# Patient Record
(demographics unavailable — no encounter records)

---

## 2024-10-10 NOTE — ASSESSMENT
[FreeTextEntry1] : #bleeding papule of skin, L lateral great toe ddx PG vs poroma vs VV vs MM vs NMSC vs other Therapeutic options and their risks and benefits; along with multiple diagnostic possibilities were discussed at length; risks and benefits of further study were discussed pt amenable to bx today for diagnostic and therapeutic purposes, discussed risk of slow healing in this site  NUBS,  L lateral great toe Biopsy by Shave The risks/benefits/alternatives of skin biopsy were explained to the patient, which include and are not limited to bleeding, infection, scarring or discoloration of skin, and recurrence of lesion. Patient expressed understanding of these risks and provided consent to the procedure. Time out with verification of patient and lesion site was performed. Site was prepped with rubbing alcohol, lidocaine with epinephrine was injected for anesthesia, and biopsy was performed. Hemostasis with electrocautery. Specimen sent to path. Procedure was without complication and well tolerated. Wound care was discussed.  #chronic L lower leg ulcers in setting of likely chronic venous stasis and lymphedema favor venous ulcers slowly improving unlikely infectious vs inflammatory vs hypertensive vs neoplastic referral to Dr. Martines , [facility] sx for evaluation and management of venous stasis/lymphedema/wound care  #elephantiasis nostras verrucosa discussed challenges in treating must address potential underlying chronic venous stasis and lymphedema start urea 40% or eucerin roughness relief cream BID to AA, not onto ulcers

## 2024-10-10 NOTE — CONSULT LETTER
[Dear  ___] : Dear  [unfilled], [Consult Letter:] : I had the pleasure of evaluating your patient, [unfilled]. [Please see my note below.] : Please see my note below. [Consult Closing:] : Thank you very much for allowing me to participate in the care of this patient.  If you have any questions, please do not hesitate to contact me. [Sincerely,] : Sincerely, [FreeTextEntry3] : Olivia Lopez MD Department of Dermatology [facility]

## 2024-10-10 NOTE — CONSULT LETTER
[Dear  ___] : Dear  [unfilled], [Consult Letter:] : I had the pleasure of evaluating your patient, [unfilled]. [Please see my note below.] : Please see my note below. [Consult Closing:] : Thank you very much for allowing me to participate in the care of this patient.  If you have any questions, please do not hesitate to contact me. [Sincerely,] : Sincerely, [FreeTextEntry3] : Olivia Lopez MD Department of Dermatology [facility]

## 2024-10-10 NOTE — HISTORY OF PRESENT ILLNESS
[FreeTextEntry1] : np growth [de-identified] : Referred by: Dr. Perez   Ms. OSMIN SANTIAGO  is a 68 year old F w/ RA on xeljanz here for evaluation of below  #growth on L foot x 2 mo, clipped it on her own 1 mo ago and it bled a lot, has since been bleeding with minimal provocation  #ulcers on L leg x months-burning and painful, she states they are slowly improving. denies F/C. reports a h/o chronic venous insufficiency. prev saw vasc surgery a long time ago. currently using triple abx , HC, castor oil.  #roughness on legs, not improving with amlactin.   no personal or family h/o skin cancer

## 2024-10-10 NOTE — ASSESSMENT
[FreeTextEntry1] : #bleeding papule of skin, L lateral great toe ddx PG vs poroma vs VV vs MM vs NMSC vs other Therapeutic options and their risks and benefits; along with multiple diagnostic possibilities were discussed at length; risks and benefits of further study were discussed pt amenable to bx today for diagnostic and therapeutic purposes, discussed risk of slow healing in this site  NUBS,  L lateral great toe Biopsy by Shave The risks/benefits/alternatives of skin biopsy were explained to the patient, which include and are not limited to bleeding, infection, scarring or discoloration of skin, and recurrence of lesion. Patient expressed understanding of these risks and provided consent to the procedure. Time out with verification of patient and lesion site was performed. Site was prepped with rubbing alcohol, lidocaine with epinephrine was injected for anesthesia, and biopsy was performed. Hemostasis with electrocautery. Specimen sent to path. Procedure was without complication and well tolerated. Wound care was discussed.  #chronic L lower leg ulcers in setting of likely chronic venous stasis and lymphedema favor venous ulcers slowly improving unlikely infectious vs inflammatory vs hypertensive vs neoplastic referral to Dr. Martines , [facility] sx for evaluation and management of venous stasis/lymphedema/wound care  #elephantiasis nostras verrucosa discussed challenges in treating must address potential underlying chronic venous stasis and lymphedema start urea 40% or eucerin roughness relief cream BID to AA, not onto ulcers

## 2024-10-10 NOTE — PHYSICAL EXAM
[FreeTextEntry3] : friable papule on lateral L great toe multiple ulcers on L shin, fibrinous base, clean borders, no surrounding erythema b/l LE with edema, dyspigmentation, and mild diffuse cobblestoning

## 2024-10-10 NOTE — HISTORY OF PRESENT ILLNESS
[FreeTextEntry1] : np growth [de-identified] : Referred by: Dr. Perez   Ms. OSMIN SANTIAGO  is a 68 year old F w/ RA on xeljanz here for evaluation of below  #growth on L foot x 2 mo, clipped it on her own 1 mo ago and it bled a lot, has since been bleeding with minimal provocation  #ulcers on L leg x months-burning and painful, she states they are slowly improving. denies F/C. reports a h/o chronic venous insufficiency. prev saw vasc surgery a long time ago. currently using triple abx , HC, castor oil.  #roughness on legs, not improving with amlactin.   no personal or family h/o skin cancer

## 2024-11-26 NOTE — ASSESSMENT
[FreeTextEntry1] : 11-25-24 Patient is seen in consultation for BLE venous insufficiency with LLE wounds (2)- chronic. Underwent BLE ablation by Dr. VANNA Zuniga in 2021. Physical Exam: LLE medial leg wound covered with moderate amount of yellow slough. No sign of infection. LLE lateral leg wound covered with small amount of yellow slough with moderate drainage. No sign of infection. S/P excisional debridement on both wounds. Washed with Vashe.  ANGELA measurements were performed today- WNL. Measured for compression wraps-Circaid. Patient instructed to wear daily during the day, off at night. The wound has moderate drainage. AquaCell/Xtrasorb/Compression wrap.

## 2024-11-26 NOTE — HISTORY OF PRESENT ILLNESS
[FreeTextEntry1] : 11-25-24 68-year-old female presents with a long history of BLE swelling, complicated by recurrent bouts of cellulitis treated by her PCP- outpatient with antibiotics. Now has three wounds over the LLE, dating back for approximately 2.5 years. 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 The patient has difficulty applying compression garments.

## 2024-11-26 NOTE — REVIEW OF SYSTEMS
[Limb Swelling] : limb swelling [Skin Lesions] : skin lesion [Skin Wound] : skin wound [Difficulty Walking] : difficulty walking [Negative] : ENT [FreeTextEntry6] : Equalm chest rise [de-identified] : Uses cane

## 2024-11-26 NOTE — PLAN
[FreeTextEntry1] : 11-25-24 Plan: S/P excisional debridement. Washed with Vashe. Xtrasorb/Compression wrap wear daily during the day, off at night. Additional vascular testing ordered. Measured for compression wrap ( Circaid). Follow-up in 2 weeks.

## 2024-11-26 NOTE — PHYSICAL EXAM
[1+] : left 1+ [Ankle Swelling (On Exam)] : present [Varicose Veins Of Lower Extremities] : bilaterally [Ankle Swelling On The Left] : moderate [] : bilaterally [Ankle Swelling Bilaterally] : severe [Alert] : alert [Oriented to Person] : oriented to person [Oriented to Place] : oriented to place [Oriented to Time] : oriented to time [Calm] : calm [de-identified] : NAD  [de-identified] : AT [de-identified] : Supple [de-identified] : Equal chest rise [de-identified] : LLE wounds (2)

## 2024-11-26 NOTE — PHYSICAL EXAM
[1+] : left 1+ [Ankle Swelling (On Exam)] : present [Varicose Veins Of Lower Extremities] : bilaterally [Ankle Swelling On The Left] : moderate [] : bilaterally [Ankle Swelling Bilaterally] : severe [Alert] : alert [Oriented to Person] : oriented to person [Oriented to Place] : oriented to place [Oriented to Time] : oriented to time [Calm] : calm [de-identified] : NAD  [de-identified] : AT [de-identified] : Supple [de-identified] : Equal chest rise [de-identified] : LLE wounds (2)

## 2024-11-26 NOTE — REVIEW OF SYSTEMS
[Limb Swelling] : limb swelling [Skin Lesions] : skin lesion [Skin Wound] : skin wound [Difficulty Walking] : difficulty walking [Negative] : ENT [FreeTextEntry6] : Equalm chest rise [de-identified] : Uses cane

## 2025-01-09 NOTE — DATA REVIEWED
[FreeTextEntry1] : Laboratory and radiology studies that were personally reviewed at today's visit are summarized in above and below: 7/2024:   X-ray:  hand/foot - possible small erosion in right hand, + subchondral cysts,, + OA changes. feet with chronic erosive changes 5-:  L spine xray and SI joint xray  Evaluation of the lumbar spine demonstrates a leftward curvature which is felt to be related to patient positioning there is no spondylolisthesis. Vertebral body heights are maintained. Degenerative changes are seen preferentially involving the L5-S1 level where there is disc space narrowing with endplate productive changes and facet joint arthropathy. Evaluation of the sacroiliac joints demonstrates no evidence of advanced productive changes. There is no widening of the joint space. No discrete osseous erosions are identified. Hip joint spaces are maintained. Pubic symphysis is intact. :  knee xray - OA  :  normal bone density  2022 - HDL = 41, ldl = 107, chol = 163  X-ray:  hand/wrist/foot/ankle 5-2022:  no RA damage  labs 3-7-22:  hgb = 10.8, comp = wnl with cr of 0.73, nl LFT lab from last visit with + CCP and MRI with erosion, synovitis, effusion and tendinopathy.  MRI knee also with OA and meniscal disease.  note from wound care () - b/l foot wounds starting in 2-2021, s/p venous ablation  without relief.  DEXA (5-2020):  Normal Xrays (3-2020):  X-ray:  hand/wrist/foot/ankle:  no new RA related changes.

## 2025-01-09 NOTE — HISTORY OF PRESENT ILLNESS
[CCP] : Cyclic citrullinated peptide (CCP) antibody [Fatigue] : fatigue [FreeTextEntry1] : +CCP nonerosive RA here for follow-up.  -> on Xeljanz and omega XL (2-6 daily)   -> was off Xeljanz for 1 week due to infection and restarted it 1/2/2025 because while off the xeljanz she developed wrist pain   recently hospitalized for infection on the left leg  12/27- 12/30 and was given cefazolin IV and then was d/w home on antibiotics which completed on 1/2/2025  when she was on the antibiotics she was progressively better on IV antibiotics.  was on oral for 3 days and then the redness came back about 1/8/2025 and no fever (~99) . hasn't been able to get follow up with pmd or wound care.  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  [Joint Swelling] : no joint swelling [Rash] : no rash [Shortness of Breath] : no shortness of breath [Joint Pain] : no joint pain [Ocular Symptoms] : no ocular symptoms [Dysphonia] : no dysphonia [Morning Stiffness] : no morning stiffness [Chest Pain] : no chest pain [TextBox_2] : 2015 [TextBox_42] : HUMIRA (primary failure), Xeljanz XR (secondary failure) Olumiant (5-5-21 to 7-2021)_ Xeljanz (restarted 7-2021 - and stopped 6-2-22)  Orencia (6/17/22 -> 9/2022) [TextBox_44] : xeljanz (9-2022 -> ) [TextBox_70] : + fatigue due to caregiver activities (unchanged) using omega XL and she thinks it helps with joint pain  hasn't needed an advil in a while  joint pain - knee and back - non RA feels she has lost muscle strength over the years

## 2025-01-09 NOTE — ASSESSMENT
[FreeTextEntry1] : OSMIN SANTIAGO is a 69 yo F here for f/u for Rheumatoid Arthritis and also with HTN obesity and LE wounds  ACUTE INFECTION LEFT LEG  -> will go back to [facility] for re-eval and likely needs IV antibiotics again  -> hold xeljanz until 1 week after antibiotics  -> offered EMS but she will have her friend drive her to the ER    COUGH and exposure to RSV  -> needs  swab test for respiratory virus incl RSV   RA +CCP non-erosive Disease activity: LDA clinically Goal of treatment is RA disease remission Current medications: Xeljanz  -> current will continue Xeljanz and patient is aware of BB warning. takes medication for HTN discussed diet for CV health. Risks of, benefits of and alternatives to this treatment plan discussed with patient and patient expressed understanding. Current RA medications require blood work Q 3 months to assess for toxicity (bone marrow toxicity, liver toxicity, kidney toxicity) - recent x-rays reviewed and next set in 7-2026.  will monitor for worsening of erosions at next imaging and decide if need to change therapy  - Q gold due  and hep due 9-2025 hold xeljanz as above   Back pain:  - not currently active    More than 50% of the encounter was spent counseling OSMIN PAMELA on the differential, workup, disease course, and treatment/management.   Education was provided to OSMIN SANTIAGO during this encounter. All questions and concerns were answered and addressed in detail.  OSMIN SANTIAGO verbalized understanding and agreed to the plan.   OSMIN SANTIAGO has been instructed to call for an earlier appointment if new symptoms develop in the interim. OSMIN SANTIAGO has been instructed to make a follow-up appointment once d/c from the hospital

## 2025-01-09 NOTE — PHYSICAL EXAM
[General Appearance - Alert] : alert [Sclera] : the sclera and conjunctiva were normal [General Appearance - In No Acute Distress] : in no acute distress [Outer Ear] : the ears and nose were normal in appearance [Neck Appearance] : the appearance of the neck was normal [] : the neck was supple [No Focal Deficits] : no focal deficits [Oriented To Time, Place, And Person] : oriented to person, place, and time [FreeTextEntry1] : left leg with redness and wamth from top of bangage moving up the left medial leg

## 2025-03-02 NOTE — PHYSICAL EXAM
[1+] : left 1+ [Ankle Swelling (On Exam)] : present [Varicose Veins Of Lower Extremities] : bilaterally [Ankle Swelling On The Left] : moderate [] : bilaterally [Ankle Swelling Bilaterally] : severe [Alert] : alert [Oriented to Person] : oriented to person [Oriented to Place] : oriented to place [Oriented to Time] : oriented to time [Calm] : calm [de-identified] : NAD  [de-identified] : Supple [de-identified] : AT [de-identified] : Equal chest rise [de-identified] : LLE wounds (2)

## 2025-03-02 NOTE — ASSESSMENT
[FreeTextEntry1] : 11-25-24 Patient is seen in consultation for BLE venous insufficiency with LLE wounds (2)- chronic. Underwent BLE ablation by Dr. VANNA Zuniga in 2021. Physical Exam: LLE medial leg wound covered with moderate amount of yellow slough. No sign of infection. LLE lateral leg wound covered with small amount of yellow slough with moderate drainage. No sign of infection. S/P excisional debridement on both wounds. Washed with Vashe.  ANGELA measurements were performed today- WNL. Measured for compression wraps-Circaid. Patient instructed to wear daily during the day, off at night. The wound has moderate drainage. AquaCell/Xtrasorb/Compression wrap.   2/24/25 Patient presents for follow up of LLE wound. Patient recently hospitalized for Cellulitis; CT scan of lower extremities performed at [facility]. On exam: LLE wound covered with yellow slough Periwound skin intact Moderate serous exudate s/p excisional debridement Wound washed with soap and water Vashe cleanse Medi Honey/Gauze/K2

## 2025-03-02 NOTE — PHYSICAL EXAM
[1+] : left 1+ [Ankle Swelling (On Exam)] : present [Varicose Veins Of Lower Extremities] : bilaterally [Ankle Swelling On The Left] : moderate [] : bilaterally [Ankle Swelling Bilaterally] : severe [Alert] : alert [Oriented to Person] : oriented to person [Oriented to Place] : oriented to place [Oriented to Time] : oriented to time [Calm] : calm [de-identified] : NAD  [de-identified] : Supple [de-identified] : AT [de-identified] : Equal chest rise [de-identified] : LLE wounds (2)

## 2025-03-02 NOTE — REVIEW OF SYSTEMS
[Limb Swelling] : limb swelling [Skin Lesions] : skin lesion [Skin Wound] : skin wound [Difficulty Walking] : difficulty walking [Negative] : ENT [FreeTextEntry6] : Equalm chest rise [de-identified] : Uses cane

## 2025-03-02 NOTE — PLAN
[FreeTextEntry1] : 11-25-24 Plan: S/P excisional debridement. Washed with Vashe. Xtrasorb/Compression wrap wear daily during the day, off at night. Additional vascular testing ordered. Measured for compression wrap ( Circaid). Follow-up in 2 weeks.  2/24/25 Plan: Wash wound with soap and water Pat Dry Apply Medi Honey to the wound bed Cover with Gauze/ABD Compression garment daily Follow up in 3 weeks.

## 2025-03-02 NOTE — REVIEW OF SYSTEMS
[Limb Swelling] : limb swelling [Skin Lesions] : skin lesion [Skin Wound] : skin wound [Difficulty Walking] : difficulty walking [Negative] : ENT [FreeTextEntry6] : Equalm chest rise [de-identified] : Uses cane

## 2025-03-20 NOTE — REVIEW OF SYSTEMS
[Limb Swelling] : limb swelling [Skin Lesions] : skin lesion [Skin Wound] : skin wound [Difficulty Walking] : difficulty walking [Negative] : ENT [FreeTextEntry6] : Equalm chest rise [de-identified] : Uses cane

## 2025-03-20 NOTE — PHYSICAL EXAM
[1+] : left 1+ [Ankle Swelling (On Exam)] : present [Varicose Veins Of Lower Extremities] : bilaterally [Ankle Swelling On The Left] : moderate [] : bilaterally [Ankle Swelling Bilaterally] : severe [Alert] : alert [Oriented to Person] : oriented to person [Oriented to Place] : oriented to place [Oriented to Time] : oriented to time [Calm] : calm [de-identified] : NAD  [de-identified] : AT [de-identified] : Supple [de-identified] : Equal chest rise [de-identified] : LLE wounds (2)

## 2025-03-20 NOTE — ASSESSMENT
[Written] : Written [Verbal] : Verbal [Patient] : Patient [Verbalizes knowledge/Understanding] : Verbalizes knowledge/understanding [Skin Care] : skin care [Signs and symptoms of infection] : sign and symptoms of infection [Nutrition] : nutrition [How and When to Call] : how and when to call [Compression Therapy] : compression therapy [Patient responsibility to plan of care] : patient responsibility to plan of care [FreeTextEntry1] : 11-25-24 Patient is seen in consultation for BLE venous insufficiency with LLE wounds (2)- chronic. Underwent BLE ablation by Dr. VANNA Zuniga in 2021. Physical Exam: LLE medial leg wound covered with moderate amount of yellow slough. No sign of infection. LLE lateral leg wound covered with small amount of yellow slough with moderate drainage. No sign of infection. S/P excisional debridement on both wounds. Washed with Vashe.  ANGELA measurements were performed today- WNL. Measured for compression wraps-Circaid. Patient instructed to wear daily during the day, off at night. The wound has moderate drainage. AquaCell/Xtrasorb/Compression wrap.   2/24/25 Patient presents for follow up of LLE wound. Patient recently hospitalized for Cellulitis; CT scan of lower extremities performed at [facility]. On exam: LLE wound covered with yellow slough Periwound skin intact Moderate serous exudate s/p excisional debridement Wound washed with soap and water Vashe cleanse Medi Honey/Gauze/K2  3/19/25 s/p excisional debridment of ulcerations Multilayer applied.  Colchicine Counseling:  Patient counseled regarding adverse effects including but not limited to stomach upset (nausea, vomiting, stomach pain, or diarrhea).  Patient instructed to limit alcohol consumption while taking this medication.  Colchicine may reduce blood counts especially with prolonged use.  The patient understands that monitoring of kidney function and blood counts may be required, especially at baseline. The patient verbalized understanding of the proper use and possible adverse effects of colchicine.  All of the patient's questions and concerns were addressed.

## 2025-04-07 NOTE — PHYSICAL EXAM
[Normal Conjunctiva] : normal conjunctiva [No Carotid Bruit] : no carotid bruit [Normal S1, S2] : normal S1, S2 [No Murmur] : no murmur [Soft] : abdomen soft [Non Tender] : non-tender [Normal Bowel Sounds] : normal bowel sounds [Moves all extremities] : moves all extremities [No Focal Deficits] : no focal deficits [Normal Speech] : normal speech [Normal] : alert and oriented, normal memory [de-identified] : walks with cane. [de-identified] : 1+ bilateral leg edema.

## 2025-04-07 NOTE — HISTORY OF PRESENT ILLNESS
[FreeTextEntry1] : Julianna Marie is a 68-year-old female with morbid obesity, Rheumatoid Arthritis and diet controlled Hypertension comes for follow up visit. Denies any chest pain or palpitations. Mild shortness of breath on exertion which is relieved with rest in few minutes of chronic duration.  Walks with cane. Monitors BP at home.

## 2025-04-07 NOTE — REVIEW OF SYSTEMS
[Blurry Vision] : no blurred vision [Dyspnea on exertion] : dyspnea during exertion [Chest Discomfort] : no chest discomfort [Lower Ext Edema] : lower extremity edema [Palpitations] : no palpitations [Orthopnea] : no orthopnea [PND] : no PND [Abdominal Pain] : no abdominal pain [Nausea] : no nausea [Vomiting] : no vomiting [Heartburn] : no heartburn [Joint Pain] : joint pain [Rash] : no rash [Itching] : no itching [Dizziness] : no dizziness [Tremor] : no tremor was seen [Confusion] : no confusion was observed [Anxiety] : no anxiety [Under Stress] : under stress [Easy Bleeding] : no tendency for easy bleeding [Easy Bruising] : no tendency for easy bruising [Negative] : Respiratory [de-identified] : numbness of feet.

## 2025-04-09 NOTE — PLAN
[FreeTextEntry1] : 11-25-24 Plan: S/P excisional debridement. Washed with Vashe. Xtrasorb/Compression wrap wear daily during the day, off at night. Additional vascular testing ordered. Measured for compression wrap ( Circaid). Follow-up in 2 weeks.  2/24/25 Plan: Wash wound with soap and water Pat Dry Apply Medi Honey to the wound bed Cover with Gauze/ABD Compression garment daily Follow up in 3 weeks.  4-9-25: Plan: LLE wound:  wash with soap and water cleanse with vashe jaya.adaptic/gauze/tape/circiads.  Circaids off at night supplies ordered f/u in 2-3 weeks

## 2025-04-09 NOTE — REVIEW OF SYSTEMS
[Limb Swelling] : limb swelling [Skin Wound] : skin wound [Difficulty Walking] : difficulty walking [Negative] : Psychiatric [FreeTextEntry6] : Equal chest rise [de-identified] : Uses cane

## 2025-04-09 NOTE — PHYSICAL EXAM
[1+] : left 1+ [Ankle Swelling (On Exam)] : present [Varicose Veins Of Lower Extremities] : bilaterally [Ankle Swelling On The Left] : moderate [] : bilaterally [Ankle Swelling Bilaterally] : severe [Skin Ulcer] : ulcer [Alert] : alert [Oriented to Person] : oriented to person [Oriented to Place] : oriented to place [Oriented to Time] : oriented to time [Calm] : calm [Please See PDF for [employer]] : Please See PDF for [employer]. [de-identified] : NAD  [de-identified] : AT [de-identified] : Supple [de-identified] : Equal chest rise

## 2025-04-09 NOTE — ASSESSMENT
[Written] : Written [Verbal] : Verbal [Patient] : Patient [Verbalizes knowledge/Understanding] : Verbalizes knowledge/understanding [Skin Care] : skin care [Signs and symptoms of infection] : sign and symptoms of infection [Nutrition] : nutrition [How and When to Call] : how and when to call [Compression Therapy] : compression therapy [Patient responsibility to plan of care] : patient responsibility to plan of care [FreeTextEntry1] : 11-25-24 Patient is seen in consultation for BLE venous insufficiency with LLE wounds (2)- chronic. Underwent BLE ablation by Dr. VANNA Zuniga in 2021. Physical Exam: LLE medial leg wound covered with moderate amount of yellow slough. No sign of infection. LLE lateral leg wound covered with small amount of yellow slough with moderate drainage. No sign of infection. S/P excisional debridement on both wounds. Washed with Vashe.  ANGELA measurements were performed today- WNL. Measured for compression wraps-Circaid. Patient instructed to wear daily during the day, off at night. The wound has moderate drainage. AquaCell/Xtrasorb/Compression wrap.   2/24/25 Patient presents for follow up of LLE wound. Patient recently hospitalized for Cellulitis; CT scan of lower extremities performed at [facility]. On exam: LLE wound covered with yellow slough Periwound skin intact Moderate serous exudate s/p excisional debridement Wound washed with soap and water Vashe cleanse Medi Honey/Gauze/K2  3/19/25 s/p excisional debridment of ulcerations Multilayer applied.  4-9-25: Pt here for f/u No new complaints On exam: LLE wound:  slough to wound bed.  s/p excisional debridement No s/s of infection.

## 2025-04-21 NOTE — ASSESSMENT
[FreeTextEntry1] : OSMIN SANTIAGO is a 69 yo F here for f/u for Rheumatoid Arthritis and also with HTN obesity and LE wounds  RA +CCP non-erosive Disease activity: LDA clinically (4 tender, non swollen joints) Goal of treatment is RA disease remission Current medications: Xeljanz  -> current will continue Xeljanz and patient is aware of BB warning. takes medication for HTN discussed diet for CV health. Risks of, benefits of and alternatives to this treatment plan discussed with patient and patient expressed understanding. Current RA medications require blood work Q 3 months to assess for toxicity (bone marrow toxicity, liver toxicity, kidney toxicity) - recent x-rays reviewed and next set in 7-2026.  will monitor for worsening of erosions at next imaging and decide if need to change therapy  - Q gold due  (ordered today) and hep due 9-2025  Back pain:  -  -> add physical therapy   Today's medical care services serve as the continuing focal point for needed health care services that are part of ongoing care related to a patient's one or more serious conditions or a complex condition.   Time spent on the encounter included but is not limited to, preparing to see the patient, obtaining and/or reviewing separately obtained history, performing the evaluation, counseling and educating, independently interpreting results with communication to the patient, order placement, referring and/or communicating with other health professionals as described, and documenting clinical information in the electronic health record.   OSMIN SANTIAGO was counseled on the differential, workup, disease course, and treatment/management. The importance of medication adherence was discussed with the patient. Education was provided to OSMIN SANTIAGO during this encounter. All questions and concerns were answered and addressed in detail.  OSMIN SANTIAGO verbalized understanding and agreed to the plan.   OSMIN SANTIAGO has been instructed to call for an earlier appointment if new symptoms develop in the interim. OSMIN SANTIAGO has been instructed to make a follow-up appointment in 3 months

## 2025-04-21 NOTE — PHYSICAL EXAM
[General Appearance - Alert] : alert [General Appearance - In No Acute Distress] : in no acute distress [Sclera] : the sclera and conjunctiva were normal [Outer Ear] : the ears and nose were normal in appearance [Neck Appearance] : the appearance of the neck was normal [] : the neck was supple [No Focal Deficits] : no focal deficits [Oriented To Time, Place, And Person] : oriented to person, place, and time [FreeTextEntry1] : no swollen joints, tender 2nd pip bilaerally, right wrist and right shoudler.

## 2025-04-21 NOTE — HISTORY OF PRESENT ILLNESS
[CCP] : Cyclic citrullinated peptide (CCP) antibody [Fatigue] : fatigue [FreeTextEntry1] : +CCP nonerosive RA here for follow-up.  -> on Xeljanz and omega XL (2-6 daily)   -> was off Xeljanz for dur to infection until end of january but is back on xeljanz   -> had hospitalization in Jan 2025 for leg infection  -> when she was off xeljanz she had severe flare of joint pain which resolved a few weeks after restarting xeljanz.  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  [Joint Swelling] : no joint swelling [Rash] : no rash [Shortness of Breath] : no shortness of breath [Joint Pain] : no joint pain [Ocular Symptoms] : no ocular symptoms [Dysphonia] : no dysphonia [Morning Stiffness] : no morning stiffness [Chest Pain] : no chest pain [TextBox_2] : 2015 [TextBox_42] : HUMIRA (primary failure), Xeljanz XR (secondary failure) Olumiant (5-5-21 to 7-2021)_ Xeljanz (restarted 7-2021 - and stopped 6-2-22)  Orencia (6/17/22 -> 9/2022) [TextBox_44] : xeljanz (9-2022 -> ) [TextBox_70] : + fatigue due to caregiver activities (unchanged) using omega XL and she thinks it helps with joint pain  joint pain - knee and back - non RA

## 2025-06-04 NOTE — REVIEW OF SYSTEMS
[Limb Swelling] : limb swelling [Skin Wound] : skin wound [Negative] : Neurological [FreeTextEntry6] : Equal chest rise [de-identified] : Uses cane

## 2025-06-04 NOTE — HISTORY OF PRESENT ILLNESS
[FreeTextEntry1] : 11-25-24 68-year-old female presents with a long history of BLE swelling, complicated by recurrent bouts of cellulitis treated by her PCP- outpatient with antibiotics. Now has three wounds over the LLE, dating back for approximately 2.5 years. 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 The patient has difficulty applying compression garments.   6-4-25 Pt presents for follow up for LLE wounds. She has been using jaya on the wounds but the wounds are significantly smaller. She continues to wear her circaids daily. No new wounds or issues.

## 2025-06-04 NOTE — PHYSICAL EXAM
[1+] : left 1+ [Ankle Swelling (On Exam)] : present [Varicose Veins Of Lower Extremities] : bilaterally [Ankle Swelling On The Left] : moderate [] : bilaterally [Ankle Swelling Bilaterally] : severe [Skin Ulcer] : ulcer [Alert] : alert [Oriented to Person] : oriented to person [Oriented to Place] : oriented to place [Oriented to Time] : oriented to time [Calm] : calm [Please See PDF for [employer]] : Please See PDF for [employer]. [de-identified] : NAD  [de-identified] : AT [de-identified] : Supple [de-identified] : Equal chest rise

## 2025-06-04 NOTE — ASSESSMENT
[FreeTextEntry1] : 11-25-24 Patient is seen in consultation for BLE venous insufficiency with LLE wounds (2)- chronic. Underwent BLE ablation by Dr. VANNA Zuniga in 2021. Physical Exam: LLE medial leg wound covered with moderate amount of yellow slough. No sign of infection. LLE lateral leg wound covered with small amount of yellow slough with moderate drainage. No sign of infection. S/P excisional debridement on both wounds. Washed with Vashe.  ANGELA measurements were performed today- WNL. Measured for compression wraps-Circaid. Patient instructed to wear daily during the day, off at night. The wound has moderate drainage. AquaCell/Xtrasorb/Compression wrap.   2/24/25 Patient presents for follow up of LLE wound. Patient recently hospitalized for Cellulitis; CT scan of lower extremities performed at [facility]. On exam: LLE wound covered with yellow slough Periwound skin intact Moderate serous exudate s/p excisional debridement Wound washed with soap and water Vashe cleanse Medi Honey/Gauze/K2  3/19/25 s/p excisional debridment of ulcerations Multilayer applied.  4-9-25: Pt here for f/u No new complaints On exam: LLE wound:  slough to wound bed.  s/p excisional debridement No s/s of infection.   6-4-25: Pt here for f/u No new complaints On exam: LLE wound: two punctate wounds with minimal sloughing. Healed skin surrounding. s/p mechanical debridement. No s/s of infection.

## 2025-06-04 NOTE — PLAN
[FreeTextEntry1] : 11-25-24 Plan: S/P excisional debridement. Washed with Vashe. Xtrasorb/Compression wrap wear daily during the day, off at night. Additional vascular testing ordered. Measured for compression wrap ( Circaid). Follow-up in 2 weeks.  2/24/25 Plan: Wash wound with soap and water Pat Dry Apply Medi Honey to the wound bed Cover with Gauze/ABD Compression garment daily Follow up in 3 weeks.  4-9-25: Plan: LLE wound:  wash with soap and water cleanse with vashe jaya.adaptic/gauze/tape/circiads.  Circaids off at night supplies ordered f/u in 2-3 weeks   6-4-25: Plan: LLE wound: wash with soap and water cleanse with vashe jaya.adaptic/gauze/tape/circiads.  Circaids off at night supplies ordered f/u in 2-3 weeks

## 2025-07-11 NOTE — REVIEW OF SYSTEMS
[Limb Swelling] : limb swelling [Skin Wound] : skin wound [Negative] : Psychiatric [FreeTextEntry6] : Equal chest rise [de-identified] : Uses cane

## 2025-07-11 NOTE — PHYSICAL EXAM
[Ankle Swelling (On Exam)] : present [Varicose Veins Of Lower Extremities] : bilaterally [Ankle Swelling On The Left] : moderate [] : bilaterally [Ankle Swelling Bilaterally] : severe [Skin Ulcer] : ulcer [Alert] : alert [Oriented to Person] : oriented to person [Oriented to Place] : oriented to place [Oriented to Time] : oriented to time [Calm] : calm [Please See PDF for Tissue Analytics] : Please See PDF for [employer] Analytics. [1+] : right 1+ [de-identified] : NAD  [de-identified] : AT [de-identified] : Supple 36.9 [de-identified] : Equal chest rise

## 2025-07-11 NOTE — ASSESSMENT
[FreeTextEntry1] : 11-25-24 Patient is seen in consultation for BLE venous insufficiency with LLE wounds (2)- chronic. Underwent BLE ablation by Dr. VANNA Zuniga in 2021. Physical Exam: LLE medial leg wound covered with moderate amount of yellow slough. No sign of infection. LLE lateral leg wound covered with small amount of yellow slough with moderate drainage. No sign of infection. S/P excisional debridement on both wounds. Washed with Vashe.  ANGELA measurements were performed today- WNL. Measured for compression wraps-Circaid. Patient instructed to wear daily during the day, off at night. The wound has moderate drainage. AquaCell/Xtrasorb/Compression wrap.   2/24/25 Patient presents for follow up of LLE wound. Patient recently hospitalized for Cellulitis; CT scan of lower extremities performed at [facility]. On exam: LLE wound covered with yellow slough Periwound skin intact Moderate serous exudate s/p excisional debridement Wound washed with soap and water Vashe cleanse Medi Honey/Gauze/K2  3/19/25 s/p excisional debridment of ulcerations Multilayer applied.  4-9-25: Pt here for f/u No new complaints On exam: LLE wound:  slough to wound bed.  s/p excisional debridement No s/s of infection.   6-4-25: Pt here for f/u No new complaints On exam: LLE wound: two punctate wounds with minimal sloughing. Healed skin surrounding. s/p mechanical debridement. No s/s of infection.  7-11-25: Pt here for f/u Recently hospitalized for RLE cellulitis ad wound Pt does dressing. Hasn't been wearing circiads because has been mostly in bed On exam: LLE healed. RLE: scattered small wounds with dried serous fluid on leg and lateral ankle scattered wounds with slough s/p excisional debridement.  No s/s of infection.

## 2025-07-11 NOTE — PLAN
[FreeTextEntry1] : 11-25-24 Plan: S/P excisional debridement. Washed with Vashe. Xtrasorb/Compression wrap wear daily during the day, off at night. Additional vascular testing ordered. Measured for compression wrap ( Circaid). Follow-up in 2 weeks.  2/24/25 Plan: Wash wound with soap and water Pat Dry Apply Medi Honey to the wound bed Cover with Gauze/ABD Compression garment daily Follow up in 3 weeks.  4-9-25: Plan: LLE wound:  wash with soap and water cleanse with vashe jaya.adaptic/gauze/tape/circiads.  Circaids off at night supplies ordered f/u in 2-3 weeks   6-4-25: Plan: LLE wound: wash with soap and water cleanse with vashe jaya.adaptic/gauze/tape/circiads.  Circaids off at night supplies ordered f/u in 2-3 weeks  7-11-25: Plan: RLE: wash with soap and water cleanse with vashe adaptic/aqaucel/rubin/ace.or circaids.  circiads off at night. LLE circaids daily.  off at night Moisturize intact skin. Do not put between toes. supplies ordered f/u 2-3 weeks

## 2025-07-25 NOTE — ASSESSMENT
[FreeTextEntry1] : 11-25-24 Patient is seen in consultation for BLE venous insufficiency with LLE wounds (2)- chronic. Underwent BLE ablation by Dr. VANNA Zuniga in 2021. Physical Exam: LLE medial leg wound covered with moderate amount of yellow slough. No sign of infection. LLE lateral leg wound covered with small amount of yellow slough with moderate drainage. No sign of infection. S/P excisional debridement on both wounds. Washed with Vashe.  ANGELA measurements were performed today- WNL. Measured for compression wraps-Circaid. Patient instructed to wear daily during the day, off at night. The wound has moderate drainage. AquaCell/Xtrasorb/Compression wrap.   2/24/25 Patient presents for follow up of LLE wound. Patient recently hospitalized for Cellulitis; CT scan of lower extremities performed at [facility]. On exam: LLE wound covered with yellow slough Periwound skin intact Moderate serous exudate s/p excisional debridement Wound washed with soap and water Vashe cleanse Medi Honey/Gauze/K2  3/19/25 s/p excisional debridment of ulcerations Multilayer applied.  4-9-25: Pt here for f/u No new complaints On exam: LLE wound:  slough to wound bed.  s/p excisional debridement No s/s of infection.   6-4-25: Pt here for f/u No new complaints On exam: LLE wound: two punctate wounds with minimal sloughing. Healed skin surrounding. s/p mechanical debridement. No s/s of infection.  7-11-25: Pt here for f/u Recently hospitalized for RLE cellulitis ad wound Pt does dressing. Hasn't been wearing circiads because has been mostly in bed On exam: LLE healed. RLE: scattered small wounds with dried serous fluid on leg and lateral ankle scattered wounds with slough s/p excisional debridement.  No s/s of infection.   7-25-25: Pt here for f/u No new complaints Pt does dressing Wearing circiads on LLE On exam: RLE wound:  lateral ankle with 2 small wounds wit slough s/p mechanical debridement No s/s of infection.

## 2025-07-25 NOTE — PHYSICAL EXAM
[1+] : right 1+ [Ankle Swelling (On Exam)] : present [Varicose Veins Of Lower Extremities] : bilaterally [Ankle Swelling On The Left] : moderate [] : bilaterally [Ankle Swelling Bilaterally] : severe [Skin Ulcer] : ulcer [Alert] : alert [Oriented to Person] : oriented to person [Oriented to Place] : oriented to place [Oriented to Time] : oriented to time [Calm] : calm [Please See PDF for Tissue Analytics] : Please See PDF for [employer]. [de-identified] : NAD  [de-identified] : AT [de-identified] : Supple [de-identified] : Equal chest rise

## 2025-07-25 NOTE — PLAN
[FreeTextEntry1] : 11-25-24 Plan: S/P excisional debridement. Washed with Vashe. Xtrasorb/Compression wrap wear daily during the day, off at night. Additional vascular testing ordered. Measured for compression wrap ( Circaid). Follow-up in 2 weeks.  2/24/25 Plan: Wash wound with soap and water Pat Dry Apply Medi Honey to the wound bed Cover with Gauze/ABD Compression garment daily Follow up in 3 weeks.  4-9-25: Plan: LLE wound:  wash with soap and water cleanse with vashe jaya.adaptic/gauze/tape/circiads.  Circaids off at night supplies ordered f/u in 2-3 weeks   6-4-25: Plan: LLE wound: wash with soap and water cleanse with vashe jaya.adaptic/gauze/tape/circiads.  Circaids off at night supplies ordered f/u in 2-3 weeks  7-11-25: Plan: RLE: wash with soap and water cleanse with vashe adaptic/aqaucel/rubin/ace.or circaids.  circiads off at night. LLE circaids daily.  off at night Moisturize intact skin. Do not put between toes. supplies ordered f/u 2-3 weeks   7-25-25: Plan: RLE wash with soap and water cleanse with vashe jaya/gauze/rubin/ace or circiads.  circaids off at night. LLE circaids daily.  off at night Moisturize intact skin. Do not put between toes. f/u 3-4 weeks

## 2025-07-25 NOTE — REVIEW OF SYSTEMS
[Limb Swelling] : limb swelling [Skin Wound] : skin wound [Negative] : Psychiatric [FreeTextEntry6] : Equal chest rise [de-identified] : Uses cane